# Patient Record
(demographics unavailable — no encounter records)

---

## 2025-05-29 NOTE — DISCUSSION/SUMMARY
[Normal Growth] : growth [Normal Development] : development  [No Elimination Concerns] : elimination [Continue Regimen] : feeding [No Skin Concerns] : skin [Normal Sleep Pattern] : sleep [None] : no medical problems [Anticipatory Guidance Given] : Anticipatory guidance addressed as per the history of present illness section [No Vaccines] : no vaccines needed [No Medications] : ~He/She~ is not on any medications [Patient] : patient [Parent/Guardian] : Parent/Guardian [FreeTextEntry1] : Well   pre adolescent.  - Myopia - Dental caries Plan - [facility] referral letter sent - List of dental clinics sent but he states they have a dentist that the family goes to. - Vaccines up to date  -Counseled regarding dental hygiene, pubertal changes, seatbelt safety, and internet safety. Healthy eating habits, exercise and keeping recreational screen time to less than 2 hours a day discussed. - Infection prevention discussed - Bright Futures Parent handout sent home     Routine dental care           Visit summary sent home

## 2025-05-29 NOTE — HISTORY OF PRESENT ILLNESS
[Yes] : Patient goes to dentist yearly [Toothpaste] : Primary Fluoride Source: Toothpaste [Up to date] : Up to date [Eats meals with family] : eats meals with family [Has family members/adults to turn to for help] : has family members/adults to turn to for help [Is permitted and is able to make independent decisions] : Is permitted and is able to make independent decisions [Sleep Concerns] : no sleep concerns [Grade: ____] : Grade: [unfilled] [Normal Performance] : normal performance [Normal Behavior/Attention] : normal behavior/attention [Normal Homework] : normal homework [Eats regular meals including adequate fruits and vegetables] : eats regular meals including adequate fruits and vegetables [Drinks non-sweetened liquids] : drinks non-sweetened liquids  [Calcium source] : calcium source [Has concerns about body or appearance] : does not have concerns about body or appearance [Has friends] : has friends [At least 1 hour of physical activity a day] : at least 1 hour of physical activity a day [Screen time (except homework) less than 2 hours a day] : no screen time (except homework) less than 2 hours a day [Uses electronic nicotine delivery system] : does not use electronic nicotine delivery system [Exposure to electronic nicotine delivery system] : no exposure to electronic nicotine delivery system [Uses tobacco] : does not use tobacco [Exposure to tobacco] : no exposure to tobacco [Uses drugs] : does not use drugs  [Exposure to drugs] : no exposure to drugs [Drinks alcohol] : does not drink alcohol [Exposure to alcohol] : no exposure to alcohol [No] : No cigarette smoke exposure [Has ways to cope with stress] : has ways to cope with stress [Displays self-confidence] : displays self-confidence [Has problems with sleep] : does not have problems with sleep [Gets depressed, anxious, or irritable/has mood swings] : does not get depressed, anxious, or irritable/has mood swings [Has thought about hurting self or considered suicide] : has not thought about hurting self or considered suicide [de-identified] : good student in [employer] [FreeTextEntry1] : 12 year old male here for well child exam  HPI: He is feeling well today with no fever, respiratory or GI concerns  PMH: no significant PMH Allergies: NKA Meds: none  Home: lives with Mom and Dad and 4 year old sister Born in [facility] and came when he was 6 years old No smokers at home  Ed: in the 6th grade in [employer]; Good grades Activity: likes to play soccer and video games No substance use Never sexually active Mental health: denies feelings of anxiety or depression Dental : went to the dentist in the past year

## 2025-05-29 NOTE — RISK ASSESSMENT
[Little interest or pleasure doing things] : 1) Little interest or pleasure doing things [Feeling down, depressed, or hopeless] : 2) Feeling down, depressed, or hopeless [0] : 2) Feeling down, depressed, or hopeless: Not at all (0) [PHQ-2 Negative - No further assessment needed] : PHQ-2 Negative - No further assessment needed [[id number]] : 0

## 2025-05-29 NOTE — PHYSICAL EXAM

## 2025-07-21 NOTE — DISCUSSION/SUMMARY
[FreeTextEntry1] : Well 12 year old - BMI 85th % Plan Anticipatory guidance re: water safety, sunscreen, healthy eating, no sugary drinks and exercise pubertal changes, dangers of smoking, substance use and vaping

## 2025-07-21 NOTE — HISTORY OF PRESENT ILLNESS
[FreeTextEntry6] : 12 year old male here for annual behavioral health history and BMI measurement  HPI: He is feeling well today with no complaints  No significant PMH  Home: lives with Mom and Dad , Aunt and 5 year old sister Born in [facility] and came when he was 6 years old No smokers at home Ed: going into the 7th grade in [employer]; Good grades 80's Average Activity: likes to play soccer and video games. Was on the soccer team last year No substance use Never sexually active Mental health: denies feelings of anxiety or depression